# Patient Record
Sex: MALE | Race: WHITE | NOT HISPANIC OR LATINO | Employment: OTHER | URBAN - METROPOLITAN AREA
[De-identification: names, ages, dates, MRNs, and addresses within clinical notes are randomized per-mention and may not be internally consistent; named-entity substitution may affect disease eponyms.]

---

## 2017-08-11 ENCOUNTER — LAB VISIT (OUTPATIENT)
Dept: LAB | Facility: HOSPITAL | Age: 52
End: 2017-08-11
Attending: INTERNAL MEDICINE
Payer: COMMERCIAL

## 2017-08-11 ENCOUNTER — OFFICE VISIT (OUTPATIENT)
Dept: INTERNAL MEDICINE | Facility: CLINIC | Age: 52
End: 2017-08-11
Payer: COMMERCIAL

## 2017-08-11 VITALS
WEIGHT: 271.19 LBS | SYSTOLIC BLOOD PRESSURE: 120 MMHG | BODY MASS INDEX: 37.97 KG/M2 | HEART RATE: 64 BPM | HEIGHT: 71 IN | DIASTOLIC BLOOD PRESSURE: 80 MMHG | OXYGEN SATURATION: 97 %

## 2017-08-11 DIAGNOSIS — K42.9 UMBILICAL HERNIA WITHOUT OBSTRUCTION AND WITHOUT GANGRENE: ICD-10-CM

## 2017-08-11 DIAGNOSIS — G47.33 OBSTRUCTIVE SLEEP APNEA: ICD-10-CM

## 2017-08-11 DIAGNOSIS — Z23 NEED FOR DIPHTHERIA-TETANUS-PERTUSSIS (TDAP) VACCINE: ICD-10-CM

## 2017-08-11 DIAGNOSIS — Z00.00 ROUTINE HISTORY AND PHYSICAL EXAMINATION OF ADULT: Primary | ICD-10-CM

## 2017-08-11 DIAGNOSIS — M79.672 LEFT FOOT PAIN: ICD-10-CM

## 2017-08-11 DIAGNOSIS — Z00.00 ROUTINE HISTORY AND PHYSICAL EXAMINATION OF ADULT: ICD-10-CM

## 2017-08-11 LAB
ALBUMIN SERPL BCP-MCNC: 3.9 G/DL
ALP SERPL-CCNC: 95 U/L
ALT SERPL W/O P-5'-P-CCNC: 31 U/L
ANION GAP SERPL CALC-SCNC: 7 MMOL/L
AST SERPL-CCNC: 44 U/L
BILIRUB SERPL-MCNC: 0.5 MG/DL
BUN SERPL-MCNC: 18 MG/DL
CALCIUM SERPL-MCNC: 9.2 MG/DL
CHLORIDE SERPL-SCNC: 104 MMOL/L
CHOLEST/HDLC SERPL: 5 {RATIO}
CO2 SERPL-SCNC: 29 MMOL/L
CREAT SERPL-MCNC: 1.1 MG/DL
EST. GFR  (AFRICAN AMERICAN): >60 ML/MIN/1.73 M^2
EST. GFR  (NON AFRICAN AMERICAN): >60 ML/MIN/1.73 M^2
GLUCOSE SERPL-MCNC: 98 MG/DL
HCV AB SERPL QL IA: NEGATIVE
HDL/CHOLESTEROL RATIO: 19.9 %
HDLC SERPL-MCNC: 211 MG/DL
HDLC SERPL-MCNC: 42 MG/DL
LDLC SERPL CALC-MCNC: 124.8 MG/DL
NONHDLC SERPL-MCNC: 169 MG/DL
POTASSIUM SERPL-SCNC: 4.3 MMOL/L
PROT SERPL-MCNC: 7.2 G/DL
SODIUM SERPL-SCNC: 140 MMOL/L
TRIGL SERPL-MCNC: 221 MG/DL
TSH SERPL DL<=0.005 MIU/L-ACNC: 1.25 UIU/ML

## 2017-08-11 PROCEDURE — 80061 LIPID PANEL: CPT

## 2017-08-11 PROCEDURE — 99386 PREV VISIT NEW AGE 40-64: CPT | Mod: 25,S$GLB,, | Performed by: INTERNAL MEDICINE

## 2017-08-11 PROCEDURE — 90715 TDAP VACCINE 7 YRS/> IM: CPT | Mod: S$GLB,,, | Performed by: INTERNAL MEDICINE

## 2017-08-11 PROCEDURE — 36415 COLL VENOUS BLD VENIPUNCTURE: CPT

## 2017-08-11 PROCEDURE — 90471 IMMUNIZATION ADMIN: CPT | Mod: S$GLB,,, | Performed by: INTERNAL MEDICINE

## 2017-08-11 PROCEDURE — 83036 HEMOGLOBIN GLYCOSYLATED A1C: CPT

## 2017-08-11 PROCEDURE — 86803 HEPATITIS C AB TEST: CPT

## 2017-08-11 PROCEDURE — 99999 PR PBB SHADOW E&M-EST. PATIENT-LVL III: CPT | Mod: PBBFAC,,, | Performed by: INTERNAL MEDICINE

## 2017-08-11 PROCEDURE — 84443 ASSAY THYROID STIM HORMONE: CPT

## 2017-08-11 PROCEDURE — 80053 COMPREHEN METABOLIC PANEL: CPT

## 2017-08-11 NOTE — PROGRESS NOTES
"Subjective:       Patient ID: Ej Montoya is a 52 y.o. male.    Chief Complaint: Establish Care and Annual Exam    HPI - Here to establish care.  He says it's "time to lose weight".  He is not regularly using his CPAP; only 1-2nights per week.  Having trouble because of bedroom disruptions at home.  He's a moderate drinker, nonsmoker.  Not currently sexually active.  No illicit drug use.  Wants to talk about an antidepressant, but we deferred that until next visit.  Has some left foot pain; needs surgery per history.  He has an umbilical hernia he wants me to evaluate    PMH:  LEA, currently off CPAP  Sciatica per history  HLD, not on medicatioin  Erectile dysfunction    Meds:  Reviewed and reconciled in EPIC with patient during visit today.    Review of Systems   Constitutional: Negative for activity change and unexpected weight change.   HENT: Negative for hearing loss, rhinorrhea and trouble swallowing.    Eyes: Negative for discharge and visual disturbance.   Respiratory: Negative for chest tightness and wheezing.    Cardiovascular: Negative for chest pain and palpitations.   Gastrointestinal: Negative for blood in stool, constipation, diarrhea and vomiting.   Endocrine: Negative for polydipsia and polyuria.   Genitourinary: Negative for difficulty urinating, hematuria and urgency.   Musculoskeletal: Negative for arthralgias, joint swelling and neck pain.   Skin: Negative for rash.   Neurological: Negative for weakness and headaches.   Psychiatric/Behavioral: Positive for dysphoric mood. Negative for confusion.       Objective:      Physical Exam   Constitutional: He is oriented to person, place, and time. He appears well-developed and well-nourished. No distress.   Friendly, obese man with nasal voice.  NAD   HENT:   Head: Normocephalic and atraumatic.   Cardiovascular: Normal rate, regular rhythm and normal heart sounds.  Exam reveals no gallop and no friction rub.    No murmur heard.  Pulmonary/Chest: No " respiratory distress. He has no wheezes. He has no rales. He exhibits no tenderness.   Abdominal:   Very small, <2cm, easily reducible umbilical hernia   Neurological: He is alert and oriented to person, place, and time.   Skin: Skin is warm. He is not diaphoretic. No erythema.   Psychiatric: He has a normal mood and affect. Thought content normal.   Nursing note and vitals reviewed.      Assessment:       1. Routine history and physical examination of adult    2. Need for diphtheria-tetanus-pertussis (Tdap) vaccine    3. Obstructive sleep apnea    4. Left foot pain    5. Umbilical hernia without obstruction and without gangrene        Plan:       Ej was seen today for establish care and annual exam.    Diagnoses and all orders for this visit:    Routine history and physical examination of adult - needs a good bit of health maintenance today.  Ordered this  -     Comprehensive metabolic panel; Future  -     Lipid panel; Future  -     Hemoglobin A1c; Future  -     TSH; Future  -     Hepatitis C antibody; Future  -     Case request GI: COLONOSCOPY    Need for diphtheria-tetanus-pertussis (Tdap) vaccine  -     (In Office Administered) Tdap Vaccine    Obstructive sleep apnea - unstable, off treatment.  Ordering more supplies.  He'll restart  -     CPAP/BIPAP SUPPLIES    Left foot pain - late complaint.  Has been recommended for foot surgery in the past, so will send to podiatry.  -     Ambulatory consult to Podiatry    Umbilical hernia without obstruction and without gangrene - stable, stay the course    rtc one month, address depressive symptoms    LAMIN Elder MD MPH  Staff Internist

## 2017-08-12 LAB
ESTIMATED AVG GLUCOSE: 114 MG/DL
HBA1C MFR BLD HPLC: 5.6 %

## 2017-12-28 ENCOUNTER — TELEPHONE (OUTPATIENT)
Dept: INTERNAL MEDICINE | Facility: CLINIC | Age: 52
End: 2017-12-28

## 2017-12-28 DIAGNOSIS — G47.30 SLEEP APNEA, UNSPECIFIED TYPE: Primary | ICD-10-CM

## 2018-01-11 ENCOUNTER — TELEPHONE (OUTPATIENT)
Dept: INTERNAL MEDICINE | Facility: CLINIC | Age: 53
End: 2018-01-11

## 2018-01-11 NOTE — TELEPHONE ENCOUNTER
----- Message from Angeles Thompson sent at 1/11/2018 11:32 AM CST -----  Contact: self/[phone]  Pt called in regards to checking the status of his order for c pap hoses and mask.        Please advise

## 2018-01-11 NOTE — TELEPHONE ENCOUNTER
Please call him back.  I ordered his cpap supplies on 12/28.  I'm not sure what happened to those orders.  Can you reprint them and send them to the appropriate place, please?  If not, I'll rewrite.    Thanks.

## 2018-10-17 ENCOUNTER — PATIENT MESSAGE (OUTPATIENT)
Dept: INTERNAL MEDICINE | Facility: CLINIC | Age: 53
End: 2018-10-17

## 2018-10-17 DIAGNOSIS — Z02.0 SCHOOL PHYSICAL EXAM: Primary | ICD-10-CM

## 2018-11-29 ENCOUNTER — OFFICE VISIT (OUTPATIENT)
Dept: INTERNAL MEDICINE | Facility: CLINIC | Age: 53
End: 2018-11-29
Payer: COMMERCIAL

## 2018-11-29 VITALS
OXYGEN SATURATION: 94 % | SYSTOLIC BLOOD PRESSURE: 138 MMHG | HEIGHT: 71 IN | WEIGHT: 273.81 LBS | BODY MASS INDEX: 38.33 KG/M2 | HEART RATE: 86 BPM | DIASTOLIC BLOOD PRESSURE: 80 MMHG

## 2018-11-29 DIAGNOSIS — F32.A DEPRESSION, UNSPECIFIED DEPRESSION TYPE: ICD-10-CM

## 2018-11-29 DIAGNOSIS — G47.33 OBSTRUCTIVE SLEEP APNEA (ADULT) (PEDIATRIC): ICD-10-CM

## 2018-11-29 DIAGNOSIS — E66.9 OBESITY (BMI 35.0-39.9 WITHOUT COMORBIDITY): ICD-10-CM

## 2018-11-29 DIAGNOSIS — Z00.00 ENCOUNTER FOR ANNUAL PHYSICAL EXAM: Primary | ICD-10-CM

## 2018-11-29 PROCEDURE — 99999 PR PBB SHADOW E&M-EST. PATIENT-LVL III: CPT | Mod: PBBFAC,,, | Performed by: STUDENT IN AN ORGANIZED HEALTH CARE EDUCATION/TRAINING PROGRAM

## 2018-11-29 PROCEDURE — 99214 OFFICE O/P EST MOD 30 MIN: CPT | Mod: S$GLB,,, | Performed by: STUDENT IN AN ORGANIZED HEALTH CARE EDUCATION/TRAINING PROGRAM

## 2018-11-29 PROCEDURE — 3008F PR BODY MASS INDEX (BMI) DOCUMENTED: ICD-10-PCS | Mod: CPTII,S$GLB,, | Performed by: STUDENT IN AN ORGANIZED HEALTH CARE EDUCATION/TRAINING PROGRAM

## 2018-11-29 PROCEDURE — 99214 PR OFFICE/OUTPT VISIT, EST, LEVL IV, 30-39 MIN: ICD-10-PCS | Mod: S$GLB,,, | Performed by: STUDENT IN AN ORGANIZED HEALTH CARE EDUCATION/TRAINING PROGRAM

## 2018-11-29 PROCEDURE — 3008F BODY MASS INDEX DOCD: CPT | Mod: CPTII,S$GLB,, | Performed by: STUDENT IN AN ORGANIZED HEALTH CARE EDUCATION/TRAINING PROGRAM

## 2018-11-29 PROCEDURE — 99999 PR PBB SHADOW E&M-EST. PATIENT-LVL III: ICD-10-PCS | Mod: PBBFAC,,, | Performed by: STUDENT IN AN ORGANIZED HEALTH CARE EDUCATION/TRAINING PROGRAM

## 2018-11-29 RX ORDER — SERTRALINE HYDROCHLORIDE 25 MG/1
25 TABLET, FILM COATED ORAL DAILY
Qty: 30 TABLET | Refills: 11 | Status: SHIPPED | OUTPATIENT
Start: 2018-11-29 | End: 2019-11-29

## 2018-11-29 NOTE — PROGRESS NOTES
"Subjective:       Patient ID: Ej Montoya is a 53 y.o. male.    Chief Complaint: Annual Exam    Ej Montoya is a 53 year old male with a medical history significant for LEA and obesity who presents here for annual physical. 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 Otherwise, he is having marital issues and is in the middle of a divorce. He is asking for an antidepressant today. Patient has had difficulty losing weight this past year. Otherwise, he only uses his nasal CPAP only once or twice per week; does not complain of the fit but just finds the device cumbersome. Denies recent illnesses, fevers, chills, chest pain, shortness of breath.       Review of Systems   Constitutional: Negative for activity change and unexpected weight change.   HENT: Negative for hearing loss, rhinorrhea and trouble swallowing.    Eyes: Negative for discharge and visual disturbance.   Respiratory: Negative for chest tightness and wheezing.    Cardiovascular: Negative for chest pain and palpitations.   Gastrointestinal: Negative for blood in stool, constipation, diarrhea and vomiting.   Endocrine: Negative for polydipsia and polyuria.   Genitourinary: Negative for difficulty urinating, hematuria and urgency.   Musculoskeletal: Negative for arthralgias, joint swelling and neck pain.   Skin: Negative for rash.   Neurological: Negative for weakness and headaches.   Psychiatric/Behavioral: Positive for dysphoric mood. Negative for confusion.       Objective:       /80 (BP Location: Right arm, Patient Position: Sitting, BP Method: Large (Manual))   Pulse 86   Ht 5' 11" (1.803 m)   Wt 124.2 kg (273 lb 13 oz)   SpO2 (!) 94%   BMI 38.19 kg/m²     Physical Exam   Constitutional: He is oriented to person, place, and time. He appears well-developed and well-nourished. No distress.   Friendly, obese man with nasal voice.  NAD   HENT:   Head: Normocephalic and " atraumatic.   Cardiovascular: Normal rate, regular rhythm and normal heart sounds. Exam reveals no gallop and no friction rub.   No murmur heard.  Pulmonary/Chest: No respiratory distress. He has no wheezes. He has no rales. He exhibits no tenderness.   Abdominal:   Very small, <2cm, easily reducible umbilical hernia   Neurological: He is alert and oriented to person, place, and time.   Skin: Skin is warm. He is not diaphoretic. No erythema.   Psychiatric: He has a normal mood and affect. Thought content normal.   Nursing note and vitals reviewed.      Assessment:       1. Encounter for annual physical exam    2. Obstructive sleep apnea (adult) (pediatric)    3. Depression, unspecified depression type        Plan:       1. Encounter for annual physical exam  - Up to date on vaccinations; lipid panel unremarkable last year as well as HgA1c and CMP.  - Needs nursing physical form filled which I have done. Also ordered TB skin test and requested he come back on Monday to have done (cannot due today as no physician present here on Saturday for read).  - Due for colonoscopy.   - Case request GI: COLONOSCOPY   - POCT TB Skin Test Read    2. Obstructive sleep apnea (adult) (pediatric)  - Encouraged compliance with CPAP.     3. Depression, unspecified depression type  - PHQ9 score of 11; will re-evaluate on low dose sertraline in 3 months. Significant life stressors (see HPI)    - sertraline (ZOLOFT) 25 MG tablet; Take 1 tablet (25 mg total) by mouth once daily.  Dispense: 30 tablet; Refill: 11    4. Obesity (BMI 35.0-39.9 without comorbidity)  - BMI 38; constant since 2015.   - Encouraged keeping food journal and cutting back on 300-500 calories on average per week, as tolerated.  - Asked him to weigh himself everyday as well.   - Deferring medical fitness program today     Disposition: RTC 3 months.     Moe Ornelas MD  PGY-3 Internal Medicine  [phone]

## 2019-03-13 ENCOUNTER — TELEPHONE (OUTPATIENT)
Dept: INTERNAL MEDICINE | Facility: CLINIC | Age: 54
End: 2019-03-13
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
## 2019-04-30 ENCOUNTER — TELEPHONE (OUTPATIENT)
Dept: INTERNAL MEDICINE | Facility: CLINIC | Age: 54
End: 2019-04-30

## 2019-04-30 DIAGNOSIS — Z12.11 SCREENING FOR MALIGNANT NEOPLASM OF COLON: Primary | ICD-10-CM

## 2019-04-30 NOTE — TELEPHONE ENCOUNTER
----- Message from Sissy Garcia sent at 4/30/2019  2:02 PM CDT -----  Contact: [phone]  Patient would like to get a referral.  Referral to what specialty:  Colonoscopy   Does the patient want the referral with a specific physician:  no  Is the specialist an [facility]:    Reason (be specific):    Does the patient already have the specialty clinic appointment scheduled: no   If yes, what date is the appointment scheduled:     Is the insurance listed in Epic correct? (this is important for a referral):  yes  Comments:  Please advise, thanks  ##Advise the patient that once the physician approves this either a nurse or the  will return their call##